# Patient Record
Sex: FEMALE | Race: WHITE | Employment: OTHER | URBAN - METROPOLITAN AREA
[De-identification: names, ages, dates, MRNs, and addresses within clinical notes are randomized per-mention and may not be internally consistent; named-entity substitution may affect disease eponyms.]

---

## 2023-07-25 ENCOUNTER — HOSPITAL ENCOUNTER (EMERGENCY)
Facility: HOSPITAL | Age: 73
Discharge: HOME | End: 2023-07-25
Payer: MEDICARE

## 2023-07-25 VITALS
RESPIRATION RATE: 18 BRPM | HEIGHT: 63 IN | BODY MASS INDEX: 28.35 KG/M2 | WEIGHT: 160 LBS | SYSTOLIC BLOOD PRESSURE: 162 MMHG | OXYGEN SATURATION: 98 % | TEMPERATURE: 98 F | DIASTOLIC BLOOD PRESSURE: 71 MMHG | HEART RATE: 86 BPM

## 2023-07-25 DIAGNOSIS — C78.89 METASTATIC ADENOCARCINOMA TO PANCREAS (HCC): ICD-10-CM

## 2023-07-25 DIAGNOSIS — Z76.0 MEDICATION REFILL: Primary | ICD-10-CM

## 2023-07-25 PROCEDURE — 99283 EMERGENCY DEPT VISIT LOW MDM: CPT

## 2023-07-25 RX ORDER — HYDROCODONE BITARTRATE AND ACETAMINOPHEN 5; 325 MG/1; MG/1
1 TABLET ORAL EVERY 6 HOURS PRN
Qty: 12 TABLET | Refills: 0 | Status: SHIPPED | OUTPATIENT
Start: 2023-07-25 | End: 2023-07-28

## 2023-07-25 ASSESSMENT — LIFESTYLE VARIABLES
HOW OFTEN DO YOU HAVE A DRINK CONTAINING ALCOHOL: NEVER
HOW MANY STANDARD DRINKS CONTAINING ALCOHOL DO YOU HAVE ON A TYPICAL DAY: PATIENT DOES NOT DRINK

## 2023-07-25 ASSESSMENT — PAIN SCALES - GENERAL: PAINLEVEL_OUTOF10: 10

## 2023-07-25 ASSESSMENT — PAIN - FUNCTIONAL ASSESSMENT: PAIN_FUNCTIONAL_ASSESSMENT: 0-10

## 2023-07-25 NOTE — ED TRIAGE NOTES
States hx pancreatic cancer, was at [employer], states leaving [address] Friday for [place]. Wants pain medication for flight back to [place].

## 2023-07-25 NOTE — ED PROVIDER NOTES
[facility] EMERGENCY DEPT  EMERGENCY DEPARTMENT HISTORY AND PHYSICAL EXAM      Date: 7/25/2023  Patient Name: Yoan Jamison  MRN: [id number]  [facility] [facility] 1950  Date of evaluation: 7/25/2023  Provider: Ivon Lenz PA-C   Note Started: 11:56 AM EDT 7/25/23    HISTORY OF PRESENT ILLNESS     Chief Complaint   Patient presents with    Medication Refill       History Provided By: Patient    HPI: Yoan Jamison is a 75 y.o. female with history of metastatic pancreatic cancer, presents requesting pain medication. Patient states that she is here from [facility] moving her son here. While she was here, began to have excruciating back and abdominal pain and was evaluated at [facility] diagnosed with pancreatic adenocarcinoma with metastases. Patient states that she was referred to an oncologist, however is unable to make an appointment with him as she flies back to [place] in 2 days. PAST MEDICAL HISTORY   Past Medical History:  No past medical history on file. Past Surgical History:  No past surgical history on file. Family History:  No family history on file. Social History: Allergies:  No Known Allergies    PCP: None None    Current Meds:   No current facility-administered medications for this encounter. Current Outpatient Medications   Medication Sig Dispense Refill    HYDROcodone-acetaminophen (NORCO) 5-325 MG per tablet Take 1 tablet by mouth every 6 hours as needed for Pain for up to 3 days. Intended supply: 3 days.  Take lowest dose possible to manage pain Max Daily Amount: 4 tablets 12 tablet 0       Social Determinants of Health:   Social Determinants of Health     Tobacco Use: Not on file   Alcohol Use: Not At Risk    Frequency of Alcohol Consumption: Never    Average Number of Drinks: Patient does not drink    Frequency of Binge Drinking: Never   Financial Resource Strain: Not on file   Food Insecurity: Not on file   Transportation Needs: Not on file   Physical Activity: Not on file